# Patient Record
Sex: FEMALE | Race: WHITE | Employment: UNEMPLOYED | URBAN - METROPOLITAN AREA
[De-identification: names, ages, dates, MRNs, and addresses within clinical notes are randomized per-mention and may not be internally consistent; named-entity substitution may affect disease eponyms.]

---

## 2020-07-27 ENCOUNTER — APPOINTMENT (OUTPATIENT)
Dept: CT IMAGING | Age: 70
DRG: 853 | End: 2020-07-27
Payer: COMMERCIAL

## 2020-07-27 ENCOUNTER — HOSPITAL ENCOUNTER (INPATIENT)
Age: 70
LOS: 2 days | Discharge: HOME | DRG: 853 | End: 2020-07-30
Attending: EMERGENCY MEDICINE | Admitting: STUDENT IN AN ORGANIZED HEALTH CARE EDUCATION/TRAINING PROGRAM
Payer: COMMERCIAL

## 2020-07-27 DIAGNOSIS — L02.214 GROIN ABSCESS: ICD-10-CM

## 2020-07-27 DIAGNOSIS — L03.119 CELLULITIS AND ABSCESS OF LEG: Primary | ICD-10-CM

## 2020-07-27 DIAGNOSIS — L02.419 CELLULITIS AND ABSCESS OF LEG: Primary | ICD-10-CM

## 2020-07-27 LAB
ANION GAP SERPL CALCULATED.3IONS-SCNC: 13 MMOL/L (ref 3–16)
BASE EXCESS VENOUS: 4.2 MMOL/L (ref -2–3)
BASOPHILS ABSOLUTE: 0.1 K/UL (ref 0–0.2)
BASOPHILS RELATIVE PERCENT: 0.5 %
BUN BLDV-MCNC: 7 MG/DL (ref 7–20)
CALCIUM SERPL-MCNC: 9.3 MG/DL (ref 8.3–10.6)
CARBOXYHEMOGLOBIN: 1.1 % (ref 0–1.5)
CHLORIDE BLD-SCNC: 97 MMOL/L (ref 99–110)
CO2: 25 MMOL/L (ref 21–32)
CREAT SERPL-MCNC: 0.7 MG/DL (ref 0.6–1.2)
EOSINOPHILS ABSOLUTE: 0 K/UL (ref 0–0.6)
EOSINOPHILS RELATIVE PERCENT: 0.3 %
GFR AFRICAN AMERICAN: >60
GFR NON-AFRICAN AMERICAN: >60
GLUCOSE BLD-MCNC: 109 MG/DL (ref 70–99)
HCO3 VENOUS: 28.7 MMOL/L (ref 24–28)
HCT VFR BLD CALC: 37.2 % (ref 36–48)
HEMOGLOBIN, VEN, REDUCED: 64.8 %
HEMOGLOBIN: 12.5 G/DL (ref 12–16)
LACTIC ACID: 1.3 MMOL/L (ref 0.4–2)
LYMPHOCYTES ABSOLUTE: 1.6 K/UL (ref 1–5.1)
LYMPHOCYTES RELATIVE PERCENT: 13.2 %
MAGNESIUM: 1.9 MG/DL (ref 1.8–2.4)
MCH RBC QN AUTO: 30.5 PG (ref 26–34)
MCHC RBC AUTO-ENTMCNC: 33.5 G/DL (ref 31–36)
MCV RBC AUTO: 91 FL (ref 80–100)
METHEMOGLOBIN VENOUS: 0.5 % (ref 0–1.5)
MONOCYTES ABSOLUTE: 1.2 K/UL (ref 0–1.3)
MONOCYTES RELATIVE PERCENT: 9.4 %
NEUTROPHILS ABSOLUTE: 9.4 K/UL (ref 1.7–7.7)
NEUTROPHILS RELATIVE PERCENT: 76.6 %
O2 SAT, VEN: 34 %
PCO2, VEN: 44.4 MMHG (ref 41–51)
PDW BLD-RTO: 13.3 % (ref 12.4–15.4)
PH VENOUS: 7.43 (ref 7.35–7.45)
PLATELET # BLD: 394 K/UL (ref 135–450)
PMV BLD AUTO: 8.1 FL (ref 5–10.5)
PO2, VEN: 25.1 MMHG (ref 25–40)
POTASSIUM REFLEX MAGNESIUM: 3.4 MMOL/L (ref 3.5–5.1)
RBC # BLD: 4.09 M/UL (ref 4–5.2)
SODIUM BLD-SCNC: 135 MMOL/L (ref 136–145)
TCO2 CALC VENOUS: 30 MMOL/L
WBC # BLD: 12.2 K/UL (ref 4–11)

## 2020-07-27 PROCEDURE — 80048 BASIC METABOLIC PNL TOTAL CA: CPT

## 2020-07-27 PROCEDURE — 2580000003 HC RX 258: Performed by: PHYSICIAN ASSISTANT

## 2020-07-27 PROCEDURE — 6360000002 HC RX W HCPCS: Performed by: PHYSICIAN ASSISTANT

## 2020-07-27 PROCEDURE — 96368 THER/DIAG CONCURRENT INF: CPT

## 2020-07-27 PROCEDURE — 82803 BLOOD GASES ANY COMBINATION: CPT

## 2020-07-27 PROCEDURE — 96365 THER/PROPH/DIAG IV INF INIT: CPT

## 2020-07-27 PROCEDURE — 83735 ASSAY OF MAGNESIUM: CPT

## 2020-07-27 PROCEDURE — 87040 BLOOD CULTURE FOR BACTERIA: CPT

## 2020-07-27 PROCEDURE — 96366 THER/PROPH/DIAG IV INF ADDON: CPT

## 2020-07-27 PROCEDURE — 85025 COMPLETE CBC W/AUTO DIFF WBC: CPT

## 2020-07-27 PROCEDURE — 99284 EMERGENCY DEPT VISIT MOD MDM: CPT

## 2020-07-27 PROCEDURE — 81001 URINALYSIS AUTO W/SCOPE: CPT

## 2020-07-27 PROCEDURE — 6370000000 HC RX 637 (ALT 250 FOR IP): Performed by: PHYSICIAN ASSISTANT

## 2020-07-27 PROCEDURE — 83605 ASSAY OF LACTIC ACID: CPT

## 2020-07-27 RX ORDER — FLUCONAZOLE 200 MG/1
200 TABLET ORAL ONCE
Status: COMPLETED | OUTPATIENT
Start: 2020-07-27 | End: 2020-07-27

## 2020-07-27 RX ORDER — SODIUM CHLORIDE, SODIUM LACTATE, POTASSIUM CHLORIDE, AND CALCIUM CHLORIDE .6; .31; .03; .02 G/100ML; G/100ML; G/100ML; G/100ML
30 INJECTION, SOLUTION INTRAVENOUS ONCE
Status: COMPLETED | OUTPATIENT
Start: 2020-07-27 | End: 2020-07-28

## 2020-07-27 RX ADMIN — SODIUM CHLORIDE, POTASSIUM CHLORIDE, SODIUM LACTATE AND CALCIUM CHLORIDE 2382 ML: 600; 310; 30; 20 INJECTION, SOLUTION INTRAVENOUS at 21:54

## 2020-07-27 RX ADMIN — FLUCONAZOLE 200 MG: 200 TABLET ORAL at 21:54

## 2020-07-27 RX ADMIN — CEFAZOLIN SODIUM 1 G: 1 POWDER, FOR SOLUTION INTRAMUSCULAR; INTRAVENOUS at 21:54

## 2020-07-27 SDOH — HEALTH STABILITY: MENTAL HEALTH: HOW OFTEN DO YOU HAVE A DRINK CONTAINING ALCOHOL?: NEVER

## 2020-07-27 ASSESSMENT — ENCOUNTER SYMPTOMS
VOMITING: 0
COUGH: 0
NAUSEA: 0
SHORTNESS OF BREATH: 0
ABDOMINAL PAIN: 0
DIARRHEA: 0

## 2020-07-27 ASSESSMENT — PAIN DESCRIPTION - LOCATION: LOCATION: GROIN

## 2020-07-27 ASSESSMENT — PAIN SCALES - GENERAL: PAINLEVEL_OUTOF10: 5

## 2020-07-27 ASSESSMENT — PAIN DESCRIPTION - PAIN TYPE: TYPE: ACUTE PAIN

## 2020-07-27 ASSESSMENT — PAIN DESCRIPTION - ORIENTATION: ORIENTATION: RIGHT

## 2020-07-27 ASSESSMENT — PAIN DESCRIPTION - DESCRIPTORS: DESCRIPTORS: BURNING

## 2020-07-28 ENCOUNTER — ANESTHESIA (OUTPATIENT)
Dept: OPERATING ROOM | Age: 70
DRG: 853 | End: 2020-07-28
Payer: COMMERCIAL

## 2020-07-28 ENCOUNTER — ANESTHESIA EVENT (OUTPATIENT)
Dept: OPERATING ROOM | Age: 70
DRG: 853 | End: 2020-07-28
Payer: COMMERCIAL

## 2020-07-28 ENCOUNTER — APPOINTMENT (OUTPATIENT)
Dept: CT IMAGING | Age: 70
DRG: 853 | End: 2020-07-28
Payer: COMMERCIAL

## 2020-07-28 VITALS — OXYGEN SATURATION: 99 % | TEMPERATURE: 97.5 F | SYSTOLIC BLOOD PRESSURE: 91 MMHG | DIASTOLIC BLOOD PRESSURE: 52 MMHG

## 2020-07-28 PROBLEM — L02.214 GROIN ABSCESS: Status: ACTIVE | Noted: 2020-07-28

## 2020-07-28 LAB
ABO/RH: NORMAL
ANTIBODY SCREEN: NORMAL
BACTERIA: ABNORMAL /HPF
BILIRUBIN URINE: NEGATIVE
BLOOD, URINE: ABNORMAL
CLARITY: CLEAR
COLOR: YELLOW
EKG ATRIAL RATE: 87 BPM
EKG DIAGNOSIS: NORMAL
EKG P AXIS: 56 DEGREES
EKG P-R INTERVAL: 170 MS
EKG Q-T INTERVAL: 370 MS
EKG QRS DURATION: 90 MS
EKG QTC CALCULATION (BAZETT): 445 MS
EKG R AXIS: 57 DEGREES
EKG T AXIS: 44 DEGREES
EKG VENTRICULAR RATE: 87 BPM
EPITHELIAL CELLS, UA: ABNORMAL /HPF (ref 0–5)
GLUCOSE URINE: NEGATIVE MG/DL
INR BLD: 1.2 (ref 0.86–1.14)
KETONES, URINE: 15 MG/DL
LEUKOCYTE ESTERASE, URINE: ABNORMAL
MICROSCOPIC EXAMINATION: YES
NITRITE, URINE: NEGATIVE
PH UA: 7 (ref 5–8)
PROTEIN UA: ABNORMAL MG/DL
PROTHROMBIN TIME: 13.9 SEC (ref 10–13.2)
RBC UA: ABNORMAL /HPF (ref 0–4)
SPECIFIC GRAVITY UA: 1.01 (ref 1–1.03)
URINE TYPE: ABNORMAL
UROBILINOGEN, URINE: 0.2 E.U./DL
WBC UA: ABNORMAL /HPF (ref 0–5)

## 2020-07-28 PROCEDURE — 3600000012 HC SURGERY LEVEL 2 ADDTL 15MIN: Performed by: SURGERY

## 2020-07-28 PROCEDURE — 6360000004 HC RX CONTRAST MEDICATION: Performed by: EMERGENCY MEDICINE

## 2020-07-28 PROCEDURE — 72193 CT PELVIS W/DYE: CPT

## 2020-07-28 PROCEDURE — 87102 FUNGUS ISOLATION CULTURE: CPT

## 2020-07-28 PROCEDURE — 6360000002 HC RX W HCPCS: Performed by: STUDENT IN AN ORGANIZED HEALTH CARE EDUCATION/TRAINING PROGRAM

## 2020-07-28 PROCEDURE — 7100000000 HC PACU RECOVERY - FIRST 15 MIN: Performed by: SURGERY

## 2020-07-28 PROCEDURE — 2580000003 HC RX 258: Performed by: STUDENT IN AN ORGANIZED HEALTH CARE EDUCATION/TRAINING PROGRAM

## 2020-07-28 PROCEDURE — 87077 CULTURE AEROBIC IDENTIFY: CPT

## 2020-07-28 PROCEDURE — 85610 PROTHROMBIN TIME: CPT

## 2020-07-28 PROCEDURE — 87075 CULTR BACTERIA EXCEPT BLOOD: CPT

## 2020-07-28 PROCEDURE — U0003 INFECTIOUS AGENT DETECTION BY NUCLEIC ACID (DNA OR RNA); SEVERE ACUTE RESPIRATORY SYNDROME CORONAVIRUS 2 (SARS-COV-2) (CORONAVIRUS DISEASE [COVID-19]), AMPLIFIED PROBE TECHNIQUE, MAKING USE OF HIGH THROUGHPUT TECHNOLOGIES AS DESCRIBED BY CMS-2020-01-R: HCPCS

## 2020-07-28 PROCEDURE — 6370000000 HC RX 637 (ALT 250 FOR IP): Performed by: ANESTHESIOLOGY

## 2020-07-28 PROCEDURE — 1200000000 HC SEMI PRIVATE

## 2020-07-28 PROCEDURE — 6360000002 HC RX W HCPCS: Performed by: NURSE ANESTHETIST, CERTIFIED REGISTERED

## 2020-07-28 PROCEDURE — 93005 ELECTROCARDIOGRAM TRACING: CPT | Performed by: STUDENT IN AN ORGANIZED HEALTH CARE EDUCATION/TRAINING PROGRAM

## 2020-07-28 PROCEDURE — 7100000001 HC PACU RECOVERY - ADDTL 15 MIN: Performed by: SURGERY

## 2020-07-28 PROCEDURE — 0J9L0ZZ DRAINAGE OF RIGHT UPPER LEG SUBCUTANEOUS TISSUE AND FASCIA, OPEN APPROACH: ICD-10-PCS | Performed by: SURGERY

## 2020-07-28 PROCEDURE — 2580000003 HC RX 258: Performed by: PHYSICIAN ASSISTANT

## 2020-07-28 PROCEDURE — 3700000000 HC ANESTHESIA ATTENDED CARE: Performed by: SURGERY

## 2020-07-28 PROCEDURE — 3600000002 HC SURGERY LEVEL 2 BASE: Performed by: SURGERY

## 2020-07-28 PROCEDURE — 2500000003 HC RX 250 WO HCPCS: Performed by: PHYSICIAN ASSISTANT

## 2020-07-28 PROCEDURE — 2500000003 HC RX 250 WO HCPCS: Performed by: NURSE ANESTHETIST, CERTIFIED REGISTERED

## 2020-07-28 PROCEDURE — 86850 RBC ANTIBODY SCREEN: CPT

## 2020-07-28 PROCEDURE — 2500000003 HC RX 250 WO HCPCS: Performed by: STUDENT IN AN ORGANIZED HEALTH CARE EDUCATION/TRAINING PROGRAM

## 2020-07-28 PROCEDURE — 27301 DRAIN THIGH/KNEE LESION: CPT | Performed by: SURGERY

## 2020-07-28 PROCEDURE — 86900 BLOOD TYPING SEROLOGIC ABO: CPT

## 2020-07-28 PROCEDURE — 87070 CULTURE OTHR SPECIMN AEROBIC: CPT

## 2020-07-28 PROCEDURE — 87206 SMEAR FLUORESCENT/ACID STAI: CPT

## 2020-07-28 PROCEDURE — 87040 BLOOD CULTURE FOR BACTERIA: CPT

## 2020-07-28 PROCEDURE — 87116 MYCOBACTERIA CULTURE: CPT

## 2020-07-28 PROCEDURE — 87205 SMEAR GRAM STAIN: CPT

## 2020-07-28 PROCEDURE — 2709999900 HC NON-CHARGEABLE SUPPLY: Performed by: SURGERY

## 2020-07-28 PROCEDURE — 87015 SPECIMEN INFECT AGNT CONCNTJ: CPT

## 2020-07-28 PROCEDURE — 3700000001 HC ADD 15 MINUTES (ANESTHESIA): Performed by: SURGERY

## 2020-07-28 PROCEDURE — 99223 1ST HOSP IP/OBS HIGH 75: CPT | Performed by: SURGERY

## 2020-07-28 PROCEDURE — 86901 BLOOD TYPING SEROLOGIC RH(D): CPT

## 2020-07-28 PROCEDURE — 2500000003 HC RX 250 WO HCPCS: Performed by: SURGERY

## 2020-07-28 PROCEDURE — 94150 VITAL CAPACITY TEST: CPT

## 2020-07-28 RX ORDER — PROPOFOL 10 MG/ML
INJECTION, EMULSION INTRAVENOUS PRN
Status: DISCONTINUED | OUTPATIENT
Start: 2020-07-28 | End: 2020-07-28 | Stop reason: SDUPTHER

## 2020-07-28 RX ORDER — FENTANYL CITRATE 50 UG/ML
25 INJECTION, SOLUTION INTRAMUSCULAR; INTRAVENOUS EVERY 5 MIN PRN
Status: DISCONTINUED | OUTPATIENT
Start: 2020-07-28 | End: 2020-07-28 | Stop reason: HOSPADM

## 2020-07-28 RX ORDER — SODIUM CHLORIDE 0.9 % (FLUSH) 0.9 %
10 SYRINGE (ML) INJECTION EVERY 12 HOURS SCHEDULED
Status: DISCONTINUED | OUTPATIENT
Start: 2020-07-28 | End: 2020-07-30 | Stop reason: HOSPADM

## 2020-07-28 RX ORDER — ONDANSETRON 2 MG/ML
4 INJECTION INTRAMUSCULAR; INTRAVENOUS
Status: DISCONTINUED | OUTPATIENT
Start: 2020-07-28 | End: 2020-07-28 | Stop reason: HOSPADM

## 2020-07-28 RX ORDER — ONDANSETRON 2 MG/ML
4 INJECTION INTRAMUSCULAR; INTRAVENOUS EVERY 6 HOURS PRN
Status: DISCONTINUED | OUTPATIENT
Start: 2020-07-28 | End: 2020-07-30 | Stop reason: HOSPADM

## 2020-07-28 RX ORDER — ACETAMINOPHEN 325 MG/1
650 TABLET ORAL EVERY 4 HOURS PRN
Status: DISCONTINUED | OUTPATIENT
Start: 2020-07-28 | End: 2020-07-30 | Stop reason: HOSPADM

## 2020-07-28 RX ORDER — ENALAPRILAT 2.5 MG/2ML
1.25 INJECTION INTRAVENOUS
Status: DISCONTINUED | OUTPATIENT
Start: 2020-07-28 | End: 2020-07-28 | Stop reason: HOSPADM

## 2020-07-28 RX ORDER — SODIUM CHLORIDE, SODIUM LACTATE, POTASSIUM CHLORIDE, CALCIUM CHLORIDE 600; 310; 30; 20 MG/100ML; MG/100ML; MG/100ML; MG/100ML
INJECTION, SOLUTION INTRAVENOUS CONTINUOUS
Status: DISCONTINUED | OUTPATIENT
Start: 2020-07-28 | End: 2020-07-29

## 2020-07-28 RX ORDER — GLYCOPYRROLATE 1 MG/5 ML
SYRINGE (ML) INTRAVENOUS PRN
Status: DISCONTINUED | OUTPATIENT
Start: 2020-07-28 | End: 2020-07-28 | Stop reason: SDUPTHER

## 2020-07-28 RX ORDER — HYDRALAZINE HYDROCHLORIDE 20 MG/ML
5 INJECTION INTRAMUSCULAR; INTRAVENOUS EVERY 5 MIN PRN
Status: DISCONTINUED | OUTPATIENT
Start: 2020-07-28 | End: 2020-07-28 | Stop reason: HOSPADM

## 2020-07-28 RX ORDER — OXYCODONE HYDROCHLORIDE 5 MG/1
5 TABLET ORAL EVERY 4 HOURS PRN
Status: DISCONTINUED | OUTPATIENT
Start: 2020-07-28 | End: 2020-07-30 | Stop reason: HOSPADM

## 2020-07-28 RX ORDER — KETAMINE HYDROCHLORIDE 50 MG/ML
INJECTION, SOLUTION, CONCENTRATE INTRAMUSCULAR; INTRAVENOUS PRN
Status: DISCONTINUED | OUTPATIENT
Start: 2020-07-28 | End: 2020-07-28 | Stop reason: SDUPTHER

## 2020-07-28 RX ORDER — BUPIVACAINE HYDROCHLORIDE AND EPINEPHRINE 5; 5 MG/ML; UG/ML
INJECTION, SOLUTION EPIDURAL; INTRACAUDAL; PERINEURAL PRN
Status: DISCONTINUED | OUTPATIENT
Start: 2020-07-28 | End: 2020-07-28 | Stop reason: ALTCHOICE

## 2020-07-28 RX ORDER — LIDOCAINE HYDROCHLORIDE 20 MG/ML
INJECTION, SOLUTION INTRAVENOUS PRN
Status: DISCONTINUED | OUTPATIENT
Start: 2020-07-28 | End: 2020-07-28 | Stop reason: SDUPTHER

## 2020-07-28 RX ORDER — OXYCODONE HYDROCHLORIDE 5 MG/1
10 TABLET ORAL EVERY 4 HOURS PRN
Status: DISCONTINUED | OUTPATIENT
Start: 2020-07-28 | End: 2020-07-30 | Stop reason: HOSPADM

## 2020-07-28 RX ORDER — ONDANSETRON 2 MG/ML
INJECTION INTRAMUSCULAR; INTRAVENOUS PRN
Status: DISCONTINUED | OUTPATIENT
Start: 2020-07-28 | End: 2020-07-28 | Stop reason: SDUPTHER

## 2020-07-28 RX ORDER — HYDROMORPHONE HCL 110MG/55ML
PATIENT CONTROLLED ANALGESIA SYRINGE INTRAVENOUS PRN
Status: DISCONTINUED | OUTPATIENT
Start: 2020-07-28 | End: 2020-07-28 | Stop reason: SDUPTHER

## 2020-07-28 RX ORDER — LABETALOL 20 MG/4 ML (5 MG/ML) INTRAVENOUS SYRINGE
5 EVERY 10 MIN PRN
Status: DISCONTINUED | OUTPATIENT
Start: 2020-07-28 | End: 2020-07-28 | Stop reason: HOSPADM

## 2020-07-28 RX ORDER — SODIUM CHLORIDE 0.9 % (FLUSH) 0.9 %
10 SYRINGE (ML) INJECTION PRN
Status: DISCONTINUED | OUTPATIENT
Start: 2020-07-28 | End: 2020-07-30 | Stop reason: HOSPADM

## 2020-07-28 RX ORDER — CLINDAMYCIN PHOSPHATE 900 MG/50ML
900 INJECTION INTRAVENOUS EVERY 8 HOURS
Status: DISCONTINUED | OUTPATIENT
Start: 2020-07-28 | End: 2020-07-29

## 2020-07-28 RX ORDER — DOCUSATE SODIUM 100 MG/1
100 CAPSULE, LIQUID FILLED ORAL 2 TIMES DAILY PRN
Status: DISCONTINUED | OUTPATIENT
Start: 2020-07-28 | End: 2020-07-30 | Stop reason: HOSPADM

## 2020-07-28 RX ORDER — FENTANYL CITRATE 50 UG/ML
50 INJECTION, SOLUTION INTRAMUSCULAR; INTRAVENOUS EVERY 5 MIN PRN
Status: DISCONTINUED | OUTPATIENT
Start: 2020-07-28 | End: 2020-07-28 | Stop reason: HOSPADM

## 2020-07-28 RX ADMIN — PROPOFOL 150 MG: 10 INJECTION, EMULSION INTRAVENOUS at 14:09

## 2020-07-28 RX ADMIN — Medication 10 ML: at 09:10

## 2020-07-28 RX ADMIN — IOPAMIDOL 80 ML: 755 INJECTION, SOLUTION INTRAVENOUS at 00:17

## 2020-07-28 RX ADMIN — CLINDAMYCIN PHOSPHATE 900 MG: 900 INJECTION, SOLUTION INTRAVENOUS at 12:27

## 2020-07-28 RX ADMIN — DOXYCYCLINE 100 MG: 100 INJECTION, POWDER, LYOPHILIZED, FOR SOLUTION INTRAVENOUS at 00:22

## 2020-07-28 RX ADMIN — Medication 0.3 MG: at 13:58

## 2020-07-28 RX ADMIN — BENZOCAINE AND MENTHOL 1 LOZENGE: 15; 3.6 LOZENGE ORAL at 20:38

## 2020-07-28 RX ADMIN — VANCOMYCIN HYDROCHLORIDE 1250 MG: 10 INJECTION, POWDER, LYOPHILIZED, FOR SOLUTION INTRAVENOUS at 16:54

## 2020-07-28 RX ADMIN — HYDROMORPHONE HYDROCHLORIDE 1.2 MG: 2 INJECTION, SOLUTION INTRAMUSCULAR; INTRAVENOUS; SUBCUTANEOUS at 13:49

## 2020-07-28 RX ADMIN — PIPERACILLIN AND TAZOBACTAM 3.38 G: 3; .375 INJECTION, POWDER, LYOPHILIZED, FOR SOLUTION INTRAVENOUS at 03:59

## 2020-07-28 RX ADMIN — PIPERACILLIN AND TAZOBACTAM 3.38 G: 3; .375 INJECTION, POWDER, LYOPHILIZED, FOR SOLUTION INTRAVENOUS at 21:56

## 2020-07-28 RX ADMIN — CLINDAMYCIN PHOSPHATE 900 MG: 900 INJECTION, SOLUTION INTRAVENOUS at 04:59

## 2020-07-28 RX ADMIN — ONDANSETRON 4 MG: 2 INJECTION INTRAMUSCULAR; INTRAVENOUS at 13:38

## 2020-07-28 RX ADMIN — FAMOTIDINE 20 MG: 10 INJECTION, SOLUTION INTRAVENOUS at 13:38

## 2020-07-28 RX ADMIN — PROPOFOL 150 MG: 10 INJECTION, EMULSION INTRAVENOUS at 13:42

## 2020-07-28 RX ADMIN — LIDOCAINE HYDROCHLORIDE 100 MG: 20 INJECTION, SOLUTION INTRAVENOUS at 13:42

## 2020-07-28 RX ADMIN — VANCOMYCIN HYDROCHLORIDE 1250 MG: 10 INJECTION, POWDER, LYOPHILIZED, FOR SOLUTION INTRAVENOUS at 05:51

## 2020-07-28 RX ADMIN — HYDROMORPHONE HYDROCHLORIDE 0.8 MG: 2 INJECTION, SOLUTION INTRAMUSCULAR; INTRAVENOUS; SUBCUTANEOUS at 13:42

## 2020-07-28 RX ADMIN — ENOXAPARIN SODIUM 40 MG: 40 INJECTION SUBCUTANEOUS at 09:13

## 2020-07-28 RX ADMIN — CLINDAMYCIN PHOSPHATE 900 MG: 900 INJECTION, SOLUTION INTRAVENOUS at 20:25

## 2020-07-28 RX ADMIN — PIPERACILLIN AND TAZOBACTAM 3.38 G: 3; .375 INJECTION, POWDER, LYOPHILIZED, FOR SOLUTION INTRAVENOUS at 12:27

## 2020-07-28 RX ADMIN — SODIUM CHLORIDE, POTASSIUM CHLORIDE, SODIUM LACTATE AND CALCIUM CHLORIDE: 600; 310; 30; 20 INJECTION, SOLUTION INTRAVENOUS at 04:00

## 2020-07-28 RX ADMIN — BENZOCAINE AND MENTHOL 1 LOZENGE: 15; 3.6 LOZENGE ORAL at 15:20

## 2020-07-28 RX ADMIN — KETAMINE HYDROCHLORIDE 25 MG: 50 INJECTION, SOLUTION INTRAMUSCULAR; INTRAVENOUS at 13:43

## 2020-07-28 ASSESSMENT — PULMONARY FUNCTION TESTS
PIF_VALUE: 30
PIF_VALUE: 8
PIF_VALUE: 17
PIF_VALUE: 0
PIF_VALUE: 3
PIF_VALUE: 0
PIF_VALUE: 13
PIF_VALUE: 13
PIF_VALUE: 23
PIF_VALUE: 11
PIF_VALUE: 0
PIF_VALUE: 14
PIF_VALUE: 12
PIF_VALUE: 14
PIF_VALUE: 13
PIF_VALUE: 0
PIF_VALUE: 14
PIF_VALUE: 8
PIF_VALUE: 17
PIF_VALUE: 13
PIF_VALUE: 9
PIF_VALUE: 13
PIF_VALUE: 14
PIF_VALUE: 14
PIF_VALUE: 12
PIF_VALUE: 17
PIF_VALUE: 8
PIF_VALUE: 12
PIF_VALUE: 17
PIF_VALUE: 14
PIF_VALUE: 8
PIF_VALUE: 12
PIF_VALUE: 0
PIF_VALUE: 13
PIF_VALUE: 14
PIF_VALUE: 14
PIF_VALUE: 0
PIF_VALUE: 2
PIF_VALUE: 14
PIF_VALUE: 13
PIF_VALUE: 14
PIF_VALUE: 12
PIF_VALUE: 12
PIF_VALUE: 14
PIF_VALUE: 13
PIF_VALUE: 13
PIF_VALUE: 12
PIF_VALUE: 3
PIF_VALUE: 17
PIF_VALUE: 2
PIF_VALUE: 13
PIF_VALUE: 14

## 2020-07-28 ASSESSMENT — PAIN SCALES - GENERAL
PAINLEVEL_OUTOF10: 0
PAINLEVEL_OUTOF10: 3
PAINLEVEL_OUTOF10: 0

## 2020-07-28 NOTE — ED PROVIDER NOTES
[address] [facility] [address] ENCOUNTER          PHYSICIAN ASSISTANT NOTE       Date of evaluation: 7/27/2020    Chief Complaint     Abscess      History of Present Illness     Rosie Perez is a 79 y.o. female who presents with complaints of abscess. Patient reports she was gardening last week and after felt a pain in her right groin. Patient states she though a bug had bit her or that she had pulled a muscle. Patient states she then developed temperatures of 103. She has been taking tylenol with improvement of her fever. She states she was seen by her PCP who obtained a chest xray and COVID test. Both of these were negative. Patient was seen again today for follow up and instructed to present to the emergency department for evaluation. Patient endorses pain to her right groin but is unable to see the area so cannot attest to any skin changes. She denies any cough, chest pain, shortness of breath, nausea, vomiting, abdominal pain, diarrhea, dysuria, or hematuria. Review of Systems     Review of Systems   Constitutional: Positive for chills and fever. Respiratory: Negative for cough and shortness of breath. Cardiovascular: Negative for chest pain. Gastrointestinal: Negative for abdominal pain, diarrhea, nausea and vomiting. Genitourinary: Negative for dysuria and hematuria. Past Medical, Surgical, Family, and Social History     She has no past medical history on file. She has a past surgical history that includes Appendectomy. Her family history is not on file. She reports that she has never smoked. She has never used smokeless tobacco. She reports that she does not drink alcohol or use drugs. Medications     Previous Medications    No medications on file       Allergies     She has No Known Allergies.     Physical Exam     INITIAL VITALS: BP: 136/83, Temp: 101 °F (38.3 °C), Pulse: 103, Resp: 18, SpO2: 99 %    General: 79 y.o. female in no apparent distress, well developed, well nourished, non-toxic appearance. HEENT: Atraumatic, normocephalic. EOMs intact. Neck:  Full range of motion. Chest/pulm: Respiratory rate normal. Speaks in complete sentences, no respiratory distress, lungs CTA bilaterally, no wheezes, rales, rhonchi. Cardiovascular: Heart rate normal. RRR, no murmurs, rubs, gallops appreciated. Abdomen: No gross distension. Soft, non-tender, non-peritoneal. No suprapubic or CVAT. : Deferred. Musculoskeletal: No evident long bone or joint deformity. Neuro: A&O x 4. Normal speech without dysarthria or aphasia. Moves all extremities spontaneously and symmetrically. Movements normal without ataxia. Skin: Warm, dry. Large amount of erythema to right groin from medial midthigh to pubic area. Induration to right groin. No appreciable areas of fluctuance. White material to groin fold. Psych: Appropriate mood and affect, normal interaction. Diagnostic Results     RADIOLOGY:  CT PELVIS W CONTRAST Additional Contrast? None   Final Result     Soft tissue induration at the right groin with amorphous fluid    collection measuring approximately 6.2 x 3.5 x 2.9 cm. Findings are    compatible with an infectious process with associated early abscess    formation in the appropriate clinical setting.               LABS:   Results for orders placed or performed during the hospital encounter of 07/27/20   CBC Auto Differential   Result Value Ref Range    WBC 12.2 (H) 4.0 - 11.0 K/uL    RBC 4.09 4.00 - 5.20 M/uL    Hemoglobin 12.5 12.0 - 16.0 g/dL    Hematocrit 37.2 36.0 - 48.0 %    MCV 91.0 80.0 - 100.0 fL    MCH 30.5 26.0 - 34.0 pg    MCHC 33.5 31.0 - 36.0 g/dL    RDW 13.3 12.4 - 15.4 %    Platelets 353 408 - 539 K/uL    MPV 8.1 5.0 - 10.5 fL    Neutrophils % 76.6 %    Lymphocytes % 13.2 %    Monocytes % 9.4 %    Eosinophils % 0.3 %    Basophils % 0.5 %    Neutrophils Absolute 9.4 (H) 1.7 - 7.7 K/uL    Lymphocytes Absolute 1.6 1.0 - 5.1 K/uL    Monocytes Absolute 1.2 0.0 - 1.3 K/uL    Eosinophils Absolute 0.0 0.0 - 0.6 K/uL    Basophils Absolute 0.1 0.0 - 0.2 K/uL   Basic Metabolic Panel w/ Reflex to MG   Result Value Ref Range    Sodium 135 (L) 136 - 145 mmol/L    Potassium reflex Magnesium 3.4 (L) 3.5 - 5.1 mmol/L    Chloride 97 (L) 99 - 110 mmol/L    CO2 25 21 - 32 mmol/L    Anion Gap 13 3 - 16    Glucose 109 (H) 70 - 99 mg/dL    BUN 7 7 - 20 mg/dL    CREATININE 0.7 0.6 - 1.2 mg/dL    GFR Non-African American >60 >60    GFR African American >60 >60    Calcium 9.3 8.3 - 10.6 mg/dL   Lactic Acid, Plasma   Result Value Ref Range    Lactic Acid 1.3 0.4 - 2.0 mmol/L   Blood Gas, Venous   Result Value Ref Range    pH, Diaz 7.426 7.350 - 7.450    pCO2, Diaz 44.4 41.0 - 51.0 mmHg    pO2, Diaz 25.1 25.0 - 40.0 mmHg    HCO3, Venous 28.7 (H) 24.0 - 28.0 mmol/L    Base Excess, Diaz 4.2 (H) -2.0 - 3.0 mmol/L    O2 Sat, Diaz 34 Not established %    Carboxyhemoglobin 1.1 0.0 - 1.5 %    MetHgb, Diaz 0.5 0.0 - 1.5 %    TC02 (Calc), Diaz 30 mmol/L    Hemoglobin, Diaz, Reduced 64.80 %   Urinalysis   Result Value Ref Range    Color, UA Yellow Straw/Yellow    Clarity, UA Clear Clear    Glucose, Ur Negative Negative mg/dL    Bilirubin Urine Negative Negative    Ketones, Urine 15 (A) Negative mg/dL    Specific Gravity, UA 1.010 1.005 - 1.030    Blood, Urine TRACE-INTACT (A) Negative    pH, UA 7.0 5.0 - 8.0    Protein, UA TRACE (A) Negative mg/dL    Urobilinogen, Urine 0.2 <2.0 E.U./dL    Nitrite, Urine Negative Negative    Leukocyte Esterase, Urine LARGE (A) Negative    Microscopic Examination YES     Urine Type Voided    Magnesium   Result Value Ref Range    Magnesium 1.90 1.80 - 2.40 mg/dL   Microscopic Urinalysis   Result Value Ref Range    WBC, UA 21-50 (A) 0 - 5 /HPF    RBC, UA 3-4 0 - 4 /HPF    Epithelial Cells, UA 0-1 0 - 5 /HPF    Bacteria, UA 2+ (A) None Seen /HPF         RECENT VITALS:  BP: 118/69, Temp: 99.3 °F (37.4 °C), Pulse: 82, Resp: 22, SpO2: 100 %       ED Course     Nursing Notes, Past Medical Hx,Past Surgical Hx, Social Hx, Allergies, and Family Hx were reviewed. The patient was given the following medications:  Orders Placed This Encounter   Medications    lactated ringers bolus    ceFAZolin (ANCEF) 1 g in dextrose 5 % 50 mL IVPB (mini-bag)    doxycycline (VIBRAMYCIN) 100 mg in dextrose 5 % 100 mL IVPB    fluconazole (DIFLUCAN) tablet 200 mg    iopamidol (ISOVUE-370) 76 % injection 80 mL       CONSULTS:  C/ Patric Owens 19 / ASSESSMENT / Caprice Jacquelyn is a 79 y.o. female presenting with complaints of abscess. On presentation, patient is well-appearing and in no acute distress. Patient is afebrile with stable VS.    Upon presentation patient was febrile to 101. She was mildly tachycardic in the low 100s. Given this and her large area of cellulitis with possible fluid collection we started her on broad-spectrum antibiotics of doxycycline and Ancef. It is possible patient has a Candida component given the white material in the groin folds. Patient received a dose of Diflucan. Labs showed WBC of 12.2. No anemia, electrolyte abnormality, or renal dysfunction noted. Lactic within normal limits. CT pelvis show \"Soft tissue induration at the right groin with amorphous fluid collection measuring approximately 6.2 x 3.5 x 2.9 cm. Findings are compatible with an infectious process with associated early abscess formation in the appropriate clinical setting. \" Patient will need admission for broad spectrum antibiotics plus or minus surgical I&D. Consult to general surgery is pending. This patient was also evaluated by the attending physician. All care plans were discussed and agreed upon. Clinical Impression     1. Cellulitis and abscess of leg        Disposition     PATIENT REFERRED TO:  No follow-up provider specified.     DISCHARGE MEDICATIONS:  New Prescriptions    No medications on file       DISPOSITION Decision To Admit 07/28/2020 12:48:00 AM       Karoline Jaquez PA-C  07/28/20 0131

## 2020-07-28 NOTE — PROGRESS NOTES
4 Eyes Skin Assessment     The patient is being assess for  Admission    I agree that 2 RN's have performed a thorough Head to Toe Skin Assessment on the patient. ALL assessment sites listed below have been assessed. Areas assessed by both nurses: Will/Taina  [x]   Head, Face, and Ears   [x]   Shoulders, Back, and Chest  [x]   Arms, Elbows, and Hands   [x]   Coccyx, Sacrum, and Ischum  [x]   Legs, Feet, and Heels        Does the Patient have Skin Breakdown?   Yes a wound was noted on the Admission Assessment and an WOUND LDA was Initiated documentation include the Erica-wound, Wound Assessment, Measurements, Dressing Treatment, Drainage, and Color\",         Trevin Prevention initiated:  No   Wound Care Orders initiated:  No      WOC nurse consulted for Pressure Injury (Stage 3,4, Unstageable, DTI, NWPT, and Complex wounds):  No      Nurse 1 eSignature: Electronically signed by Natividad Mccabe RN on 7/28/20 at 5:36 AM EDT     **SHARE this note so that the co-signing nurse is able to place an eSignature**    Nurse 2 eSignature: Electronically signed by Shey Barboza RN on 7/28/20 at 5:41 AM EDT

## 2020-07-28 NOTE — ANESTHESIA POSTPROCEDURE EVALUATION
Department of Anesthesiology  Postprocedure Note    Patient: Camille Pederson  MRN: [id number]  YOB: 1950  Date of evaluation: 7/28/2020  Time:  4:17 PM     Procedure Summary     Date:  07/28/20 Room / Location:  [address] [place] [address]    Anesthesia Start:  0123 Anesthesia Stop:  1185    Procedure:  Right deep thigh abscess incision and drainage (Right Thigh) Diagnosis:  (Right groin abscess)    Surgeon: Savage Blanca DO Responsible Provider:  Ruth Ann Fuentes DO    Anesthesia Type:  general ASA Status:  2          Anesthesia Type: No value filed. Aaron Phase I: Aaron Score: 10    Aaron Phase II:      Last vitals: Reviewed and per EMR flowsheets.        Anesthesia Post Evaluation    Patient location during evaluation: PACU  Patient participation: complete - patient participated  Level of consciousness: awake and alert  Airway patency: patent  Nausea & Vomiting: no nausea and no vomiting  Cardiovascular status: blood pressure returned to baseline  Respiratory status: acceptable  Hydration status: euvolemic

## 2020-07-28 NOTE — ED PROVIDER NOTES
ED Attending Attestation Note     Date of evaluation: 7/27/2020    This patient was seen by the advance practice provider. I have seen and examined the patient, agree with the workup, evaluation, management and diagnosis. The care plan has been discussed. My assessment reveals a large area of inguinal erythema, induration, concerning for cellulitis versus fungal infection. The patient thinks that she was bit by something a few days ago and since then she has had fever, redness, swelling. She has a focal area of induration in the inguinal fold on the right side that is quite tender to palpation. It does not extend into the perineum. It ends at about the mid thigh but does extend into her right lower abdomen. She denies any history of diabetes. .     Shruti Jones MD  07/27/20 1479

## 2020-07-28 NOTE — ANESTHESIA PRE PROCEDURE
Department of Anesthesiology  Preprocedure Note       Name:  Billie Galvez   Age:  79 y.o.  :  1950                                          MRN:  [id number]         Date:  2020      Surgeon: Jasvir Estrada): Dakota Shah DO    Procedure: Procedure(s):  right groin abscess incision and drainage    Medications prior to admission:   Prior to Admission medications    Not on File       Current medications:    Current Facility-Administered Medications   Medication Dose Route Frequency Provider Last Rate Last Dose    piperacillin-tazobactam (ZOSYN) 3.375 g in dextrose 5 % 100 mL IVPB extended infusion (mini-bag)  3.375 g Intravenous Q8H Lee Giron MD 25 mL/hr at 20 1227 3.375 g at 20 1227    vancomycin (VANCOCIN) 1,250 mg in dextrose 5 % 250 mL IVPB  15 mg/kg Intravenous Q12H Lee Giron MD   Stopped at 20 0901    clindamycin (CLEOCIN) 900 mg in dextrose 5 % 50 mL IVPB  900 mg Intravenous Q8H Lee Giron MD 50 mL/hr at 20 1227 900 mg at 20 1227    lactated ringers infusion   Intravenous Continuous Lee Giron  mL/hr at 20 0602      sodium chloride flush 0.9 % injection 10 mL  10 mL Intravenous 2 times per day Lee Giron MD   10 mL at 20 0910    sodium chloride flush 0.9 % injection 10 mL  10 mL Intravenous PRN Lee Giron MD        ondansetron [facility]) injection 4 mg  4 mg Intravenous Q6H PRN Lee Giron MD        enoxaparin (LOVENOX) injection 40 mg  40 mg Subcutaneous Daily Lee Giron MD   40 mg at 20 0913    acetaminophen (TYLENOL) tablet 650 mg  650 mg Oral Q4H PRN Lee Giron MD           Allergies:  No Known Allergies    Problem List:    Patient Active Problem List   Diagnosis Code    Groin abscess L02.214       Past Medical History:  No past medical history on file.     Past Surgical History:        Procedure Laterality Date    APPENDECTOMY         Social History:    Social History Tobacco Use    Smoking status: Never Smoker    Smokeless tobacco: Never Used   Substance Use Topics    Alcohol use: Never     Frequency: Never                                Counseling given: Not Answered      Vital Signs (Current):   Vitals:    07/28/20 0445 07/28/20 0758 07/28/20 1001 07/28/20 1229   BP: (!) 116/59 107/61  109/66   Pulse: 89 73  76   Resp: 20 18 16 16   Temp: 100.5 °F (38.1 °C) 99.8 °F (37.7 °C)  100.1 °F (37.8 °C)   TempSrc: Oral Oral  Oral   SpO2: 98% 96% 96% 96%   Weight:       Height:                                                  BP Readings from Last 3 Encounters:   07/28/20 109/66       NPO Status: Time of last liquid consumption: 2100                        Time of last solid consumption: 1600                        Date of last liquid consumption: 07/27/20                        Date of last solid food consumption: 07/26/20    BMI:   Wt Readings from Last 3 Encounters:   07/27/20 175 lb (79.4 kg)     Body mass index is 28.25 kg/m². CBC:   Lab Results   Component Value Date    WBC 12.2 07/27/2020    RBC 4.09 07/27/2020    HGB 12.5 07/27/2020    HCT 37.2 07/27/2020    MCV 91.0 07/27/2020    RDW 13.3 07/27/2020     07/27/2020       CMP:   Lab Results   Component Value Date     07/27/2020    K 3.4 07/27/2020    CL 97 07/27/2020    CO2 25 07/27/2020    BUN 7 07/27/2020    CREATININE 0.7 07/27/2020    GFRAA >60 07/27/2020    LABGLOM >60 07/27/2020    GLUCOSE 109 07/27/2020    CALCIUM 9.3 07/27/2020       POC Tests: No results for input(s): POCGLU, POCNA, POCK, POCCL, POCBUN, POCHEMO, POCHCT in the last 72 hours.     Coags:   Lab Results   Component Value Date    PROTIME 13.9 07/28/2020    INR 1.20 07/28/2020       HCG (If Applicable): No results found for: PREGTESTUR, PREGSERUM, HCG, HCGQUANT     ABGs: No results found for: PHART, PO2ART, XDC7KSI, IAM8QCB, BEART, K8SOHMZB     Type & Screen (If Applicable):  No results found for: LABABO, LABRH    Drug/Infectious Status (If Applicable):  No results found for: HIV, HEPCAB    COVID-19 Screening (If Applicable): No results found for: COVID19      Anesthesia Evaluation  Patient summary reviewed and Nursing notes reviewed  Airway: Mallampati: II  TM distance: >3 FB   Neck ROM: full  Mouth opening: > = 3 FB Dental: normal exam         Pulmonary:Negative Pulmonary ROS                              Cardiovascular:Negative CV ROS                      Neuro/Psych:   Negative Neuro/Psych ROS              GI/Hepatic/Renal: Neg GI/Hepatic/Renal ROS            Endo/Other:                      ROS comment: Groin abscess Abdominal:           Vascular: negative vascular ROS. Anesthesia Plan      general     ASA 2       Induction: intravenous. Anesthetic plan and risks discussed with patient.                       Milind Batista MD   7/28/2020

## 2020-07-28 NOTE — PLAN OF CARE
Problem: Falls - Risk of:  Goal: Will remain free from falls  Description: Will remain free from falls  Outcome: Met This Shift  Goal: Absence of physical injury  Description: Absence of physical injury  Outcome: Met This Shift     Problem: Safety:  Goal: Free from accidental physical injury  Description: Free from accidental physical injury  Outcome: Met This Shift     Problem: Daily Care:  Goal: Daily care needs are met  Description: Daily care needs are met  Outcome: Met This Shift     Problem: Discharge Planning:  Goal: Patients continuum of care needs are met  Description: Patients continuum of care needs are met  Outcome: Met This Shift     Problem: Pain:  Goal: Patient's pain/discomfort is manageable  Description: Patient's pain/discomfort is manageable  Outcome: Ongoing     Problem: Skin Integrity:  Goal: Skin integrity will stabilize  Description: Skin integrity will stabilize  Outcome: Ongoing

## 2020-07-28 NOTE — PROGRESS NOTES
Bariatric Surgery           Post-Op Note    CC: right groin abscess     Subjective:  Pt reports doing well. Patient's denies pain at this time. Ate yogurt, pudding and turkey and tolerating diet with no nausea/vomiting. Has urinated, passing flatus and ambulating without difficulty. Denies SOB, chest pain, or palpitations. Objective:  Vitals:    07/28/20 1515 07/28/20 1530 07/28/20 1545 07/28/20 1638   BP: 126/65 125/68 126/65 118/68   Pulse: 81 75 75 73   Resp: 16 16 19 16   Temp:   97.7 °F (36.5 °C) 97.4 °F (36.3 °C)   TempSrc:   Temporal Oral   SpO2: 98% 97% 96% 97%   Weight:       Height:           Intake/Output Summary (Last 24 hours) at 7/28/2020 1906  Last data filed at 7/28/2020 1900  Gross per 24 hour   Intake 2397.31 ml   Output 1675 ml   Net 722.31 ml       IntraOp Crystalloid 1000    EBL 20    U/O 0        PostOp U/O  Voiding      Continuous Infusions:      lactated ringers 125 mL/hr at 07/28/20 0602         Physical Exam:      Vitals:    07/28/20 1638   BP: 118/68   Pulse: 73   Resp: 16   Temp: 97.4 °F (36.3 °C)   SpO2: 97%       CONSTITUTIONAL:  awake, alert, cooperative, no apparent distress  LUNGS:  CTAB, no R/R/W  CV: RRR, no m/r/g  ABDOMEN:  Soft, non tender, non-distended. EXT: Right surgical dressing c/d/i, motor and sensation intact, improved erythema, and less indurated. Assessment and Plan:  Paramjit Champagne is a 79 y.o. female patient s/p right deep thigh abscess incision and drainage  POD #0    1. Diet: General diet   2. Fluids:   3. Abx: continue clindamycin, zosyn, and vancomycin   4. Pain management: Tylenol, Oxycodone   5. Patient voiding appropriately. 6. Encourage ambulation and incentive spirometry use, patient was made aware of proper use. 7. Prophylaxis: SCDs, Lovenox 40 mg qd   8.  Stable post-op check    Durell Severin, DO  7/28/2020, 7:06 PM  Pager: [phone] stated

## 2020-07-28 NOTE — PLAN OF CARE
Problem: Falls - Risk of:  Goal: Will remain free from falls  Description: Will remain free from falls  7/28/2020 1824 by Laurent Ruano RN  Outcome: Ongoing     Problem: Falls - Risk of:  Goal: Absence of physical injury  Description: Absence of physical injury  7/28/2020 1824 by Laurent Ruano RN  Outcome: Ongoing     Problem: Safety:  Goal: Free from accidental physical injury  Description: Free from accidental physical injury  7/28/2020 1824 by Laurent Ruano RN  Outcome: Ongoing     Problem: Pain:  Goal: Patient's pain/discomfort is manageable  Description: Patient's pain/discomfort is manageable  7/28/2020 1824 by Laurent Ruano RN  Outcome: Ongoing     Problem: Skin Integrity:  Goal: Skin integrity will stabilize  Description: Skin integrity will stabilize  7/28/2020 1824 by Laurent Ruano RN  Outcome: Ongoing

## 2020-07-28 NOTE — PROGRESS NOTES
PACU Transfer Note    Vitals:    07/28/20 1545   BP: 126/65   Pulse: 75   Resp: 19   Temp: 97.7 °F (36.5 °C)   SpO2: 96%       No intake/output data recorded.     Pain assessment:  Pain Level: 0    Report given to Receiving unit RN.    7/28/2020 3:58 PM

## 2020-07-28 NOTE — H&P
General Surgery   Resident History and Physical       Chief Complaint: groin abscess     History of Present Illness:    Cassi Valentin is a 79 y.o. female who presents with 1 week of right groin pain and swelling that started after she was walking in some bushes. She thinks she was bitten by a spider. Pain has been progressively getting worse and is associated with high fevers (up to 103 F). She initially thought that fevers were due to COVID, but after the test came back negative she decided to seek medical attention. She denies any obstructive symptoms, including nausea, vomiting, constipation, diarrhea. She reports regular bowel movements. She is generally healthy and denies any medical conditions, including DM, HTN, heart disease and strokes. She denies taking any medications, including blood thinners. Past Medical History:    No past medical history on file. Past Surgical History:           Procedure Laterality Date    APPENDECTOMY         Allergies:  Patient has no known allergies. Medications:   Home Meds  No current facility-administered medications on file prior to encounter. No current outpatient medications on file prior to encounter. Current Meds  piperacillin-tazobactam (ZOSYN) 3.375 g in dextrose 5 % 100 mL IVPB extended infusion (mini-bag), Q8H  vancomycin (VANCOCIN) 1,250 mg in dextrose 5 % 250 mL IVPB, Q12H  clindamycin (CLEOCIN) 900 mg in dextrose 5 % 50 mL IVPB, Q8H  lactated ringers infusion, Continuous  sodium chloride flush 0.9 % injection 10 mL, 2 times per day  sodium chloride flush 0.9 % injection 10 mL, PRN  ondansetron (ZOFRAN) injection 4 mg, Q6H PRN  enoxaparin (LOVENOX) injection 40 mg, Daily        Family History:   No family history on file. Social History:   TOBACCO:   reports that she has never smoked. She has never used smokeless tobacco.  ETOH:   reports no history of alcohol use. DRUGS:   reports no history of drug use.     ROS: A 14 point review of systems was conducted, significant findings as noted in HPI. Physical exam:       Vitals:    07/28/20 0330   BP: 129/71   Pulse: 94   Resp: 26   Temp:    SpO2: 97%       General appearance: No acute distress   Neuro: Sensory-motor grossly intact   HEENT: mucous membranes are moist  Lungs: Non labored respirations   Heart: Normal rate   Abdomen: Soft, non-tender, non-distended   Skin: no obvious rashes   Extremities: no edema  Right groin: induration and erythema extending from just above the inguinal ligament to the medial med thigh and right labia, skin rash in the left groin    Labs:    CBC:   Recent Labs     07/27/20 2147   WBC 12.2*   HGB 12.5   HCT 37.2   MCV 91.0        BMP:   Recent Labs     07/27/20 2147   *   K 3.4*   CL 97*   CO2 25   BUN 7   CREATININE 0.7     PT/INR: No results for input(s): PROTIME, INR in the last 72 hours. APTT: No results for input(s): APTT in the last 72 hours. Liver Profile:  No results found for: AST, ALT, ALB, BILIDIR, BILITOT, ALKPHOS, GGT, 5NUCNo results found for: CHOL, HDL, TRIG  UA:   Lab Results   Component Value Date    COLORU Yellow 07/27/2020    PHUR 7.0 07/27/2020    WBCUA 21-50 07/27/2020    RBCUA 3-4 07/27/2020    BACTERIA 2+ 07/27/2020    CLARITYU Clear 07/27/2020    SPECGRAV 1.010 07/27/2020    LEUKOCYTESUR LARGE 07/27/2020    UROBILINOGEN 0.2 07/27/2020    BILIRUBINUR Negative 07/27/2020    BLOODU TRACE-INTACT 07/27/2020    GLUCOSEU Negative 07/27/2020       Imaging:   CT PELVIS W CONTRAST Additional Contrast? None   Final Result     Soft tissue induration at the right groin with amorphous fluid    collection measuring approximately 6.2 x 3.5 x 2.9 cm. Findings are    compatible with an infectious process with associated early abscess    formation in the appropriate clinical setting.                  Assessment/Plan:  79 y.o. female who presents with right groin abscess     - NPO, IVF, boad spectrum antibiotics (Vanc, Zosyn, Clinda)  - Will review the CT scan with Radiology this AM, but she is tentatively added to OR for this AM  - Consent on chart   - TST, EKG, INR    Fely Whitlock MD  7/28/2020 4:13 AM

## 2020-07-28 NOTE — PROGRESS NOTES
Incentive Spirometry education and demonstration completed by Respiratory Therapy. Turning over to Nursing for routine follow-up. Minimum Predicted Vital Capacity - 884 mL. Patient's Actual Vital Capacity - 1500 mL.

## 2020-07-29 LAB
ALBUMIN SERPL-MCNC: 2.9 G/DL (ref 3.4–5)
ANION GAP SERPL CALCULATED.3IONS-SCNC: 12 MMOL/L (ref 3–16)
BASOPHILS ABSOLUTE: 0.2 K/UL (ref 0–0.2)
BASOPHILS RELATIVE PERCENT: 1.5 %
BUN BLDV-MCNC: 4 MG/DL (ref 7–20)
CALCIUM SERPL-MCNC: 8.6 MG/DL (ref 8.3–10.6)
CHLORIDE BLD-SCNC: 99 MMOL/L (ref 99–110)
CO2: 25 MMOL/L (ref 21–32)
CREAT SERPL-MCNC: 0.8 MG/DL (ref 0.6–1.2)
EOSINOPHILS ABSOLUTE: 0.1 K/UL (ref 0–0.6)
EOSINOPHILS RELATIVE PERCENT: 1.1 %
GFR AFRICAN AMERICAN: >60
GFR NON-AFRICAN AMERICAN: >60
GLUCOSE BLD-MCNC: 149 MG/DL (ref 70–99)
HCT VFR BLD CALC: 31.9 % (ref 36–48)
HEMOGLOBIN: 10.9 G/DL (ref 12–16)
LYMPHOCYTES ABSOLUTE: 1.7 K/UL (ref 1–5.1)
LYMPHOCYTES RELATIVE PERCENT: 15.2 %
MAGNESIUM: 1.7 MG/DL (ref 1.8–2.4)
MCH RBC QN AUTO: 31 PG (ref 26–34)
MCHC RBC AUTO-ENTMCNC: 34.1 G/DL (ref 31–36)
MCV RBC AUTO: 91 FL (ref 80–100)
MONOCYTES ABSOLUTE: 0.9 K/UL (ref 0–1.3)
MONOCYTES RELATIVE PERCENT: 7.8 %
NEUTROPHILS ABSOLUTE: 8.2 K/UL (ref 1.7–7.7)
NEUTROPHILS RELATIVE PERCENT: 74.4 %
PDW BLD-RTO: 13 % (ref 12.4–15.4)
PHOSPHORUS: 3.9 MG/DL (ref 2.5–4.9)
PLATELET # BLD: 353 K/UL (ref 135–450)
PMV BLD AUTO: 8.2 FL (ref 5–10.5)
POTASSIUM SERPL-SCNC: 3.7 MMOL/L (ref 3.5–5.1)
RBC # BLD: 3.5 M/UL (ref 4–5.2)
SODIUM BLD-SCNC: 136 MMOL/L (ref 136–145)
WBC # BLD: 11.1 K/UL (ref 4–11)

## 2020-07-29 PROCEDURE — 6360000002 HC RX W HCPCS: Performed by: STUDENT IN AN ORGANIZED HEALTH CARE EDUCATION/TRAINING PROGRAM

## 2020-07-29 PROCEDURE — 1200000000 HC SEMI PRIVATE

## 2020-07-29 PROCEDURE — 2500000003 HC RX 250 WO HCPCS: Performed by: STUDENT IN AN ORGANIZED HEALTH CARE EDUCATION/TRAINING PROGRAM

## 2020-07-29 PROCEDURE — 85025 COMPLETE CBC W/AUTO DIFF WBC: CPT

## 2020-07-29 PROCEDURE — 6370000000 HC RX 637 (ALT 250 FOR IP): Performed by: STUDENT IN AN ORGANIZED HEALTH CARE EDUCATION/TRAINING PROGRAM

## 2020-07-29 PROCEDURE — 2580000003 HC RX 258: Performed by: STUDENT IN AN ORGANIZED HEALTH CARE EDUCATION/TRAINING PROGRAM

## 2020-07-29 PROCEDURE — 83735 ASSAY OF MAGNESIUM: CPT

## 2020-07-29 PROCEDURE — 80069 RENAL FUNCTION PANEL: CPT

## 2020-07-29 PROCEDURE — 36415 COLL VENOUS BLD VENIPUNCTURE: CPT

## 2020-07-29 RX ADMIN — VANCOMYCIN HYDROCHLORIDE 1250 MG: 10 INJECTION, POWDER, LYOPHILIZED, FOR SOLUTION INTRAVENOUS at 04:47

## 2020-07-29 RX ADMIN — PIPERACILLIN AND TAZOBACTAM 3.38 G: 3; .375 INJECTION, POWDER, LYOPHILIZED, FOR SOLUTION INTRAVENOUS at 18:08

## 2020-07-29 RX ADMIN — CLINDAMYCIN PHOSPHATE 900 MG: 900 INJECTION, SOLUTION INTRAVENOUS at 04:00

## 2020-07-29 RX ADMIN — PIPERACILLIN AND TAZOBACTAM 3.38 G: 3; .375 INJECTION, POWDER, LYOPHILIZED, FOR SOLUTION INTRAVENOUS at 06:47

## 2020-07-29 RX ADMIN — MAGNESIUM SULFATE HEPTAHYDRATE 3 G: 500 INJECTION, SOLUTION INTRAMUSCULAR; INTRAVENOUS at 12:16

## 2020-07-29 RX ADMIN — ENOXAPARIN SODIUM 40 MG: 40 INJECTION SUBCUTANEOUS at 09:05

## 2020-07-29 RX ADMIN — VANCOMYCIN HYDROCHLORIDE 1250 MG: 10 INJECTION, POWDER, LYOPHILIZED, FOR SOLUTION INTRAVENOUS at 15:58

## 2020-07-29 RX ADMIN — HYDROMORPHONE HYDROCHLORIDE 0.25 MG: 1 INJECTION, SOLUTION INTRAMUSCULAR; INTRAVENOUS; SUBCUTANEOUS at 18:08

## 2020-07-29 RX ADMIN — POTASSIUM CHLORIDE 30 MEQ: 20 TABLET, EXTENDED RELEASE ORAL at 12:16

## 2020-07-29 ASSESSMENT — PAIN SCALES - GENERAL
PAINLEVEL_OUTOF10: 0

## 2020-07-29 NOTE — PROGRESS NOTES
Bariatrics Surgery   Daily Progress Note  Patient: Conor Castorena    CC: Right groin abscess    SUBJECTIVE:  Patient rested well overnight. States that her right lower extremity pain is greatly improved. HDS. Patient tolerating her diet well. Denies nausea and vomiting. Urinating appropriately. Having bowel movements. ROS: A 14 point review of systems was conducted, significant findings as noted above. All other systems negative. OBJECTIVE:   Infusions:   I/O:I/O last 3 completed shifts: In: 2397.3 [P.O.:240; I.V.:1588.3; IV Piggyback:569.1]  Out: 9368 [Urine:2575]           Wt Readings from Last 1 Encounters:   07/27/20 175 lb (79.4 kg)       Exam:  BP (!) 101/54   Pulse 85   Temp 100.3 °F (37.9 °C) (Oral)   Resp 16   Ht 5' 6\" (1.676 m)   Wt 175 lb (79.4 kg)   SpO2 94%   BMI 28.25 kg/m²      General appearance: No acute distress   Neuro: Sensory-motor grossly intact   HEENT: mucous membranes are moist  Lungs: Non labored respirations   Heart: Normal rate   Abdomen: Soft, non-tender, non-distended   Skin: no obvious rashes   Extremities: no edema  Right groin: Surgical incision packed, clean, and dry. Packing removed and replaced with wet to dry today. No purulence. Erythema with increased intensity with slight retraction of boarders. Improved edema and induration. LABS:   Recent Labs     07/27/20 2147 07/29/20  0436   WBC 12.2* 11.1*   HGB 12.5 10.9*   HCT 37.2 31.9*   MCV 91.0 91.0    353        Recent Labs     07/27/20 2147 07/29/20  0436   * 136   K 3.4* 3.7   CL 97* 99   CO2 25 25   PHOS  --  3.9   BUN 7 4*   CREATININE 0.7 0.8      No results for input(s): AST, ALT, ALB, BILIDIR, BILITOT, ALKPHOS in the last 72 hours. No results for input(s): LIPASE, AMYLASE in the last 72 hours. Recent Labs     07/28/20  0406   INR 1.20*      No results for input(s): CKTOTAL, CKMB, CKMBINDEX, TROPONINI in the last 72 hours.     ASSESSMENT/PLAN:   Patient is a 79 y.o. female status post I&D of right thigh abscess. POD#1.    -Continue antibiotics. Vancomycin/Zosyn. Initial cultures with gram + cocci.   -Patient has home assistance. Does not need home care. -Anticipate discharge 1-2 days pending culture results.     Elver Gregory MD  General Surgery, PGY1  07/29/20  6:10 AM  [phone]

## 2020-07-29 NOTE — PROGRESS NOTES
Physician Progress Note      Maira Coronado  [facility] #:                  [id number]  :                       1950  ADMIT DATE:       2020 9:12 PM  [address] [place] DATE:  RESPONDING  PROVIDER #:        Eileen Elena MD          QUERY TEXT:    Pt admitted with Abscess right groin. SIRS on admission: WBC: 12.2, T. 101.0,   HR: 103 . If possible, please document in the progress notes and discharge   summary if you are evaluating and /or treating any of the following: The medical record reflects the following:  Risk Factors: 79 y.o., developed right groin pain last week  Clinical Indicators: Temp at home 103.0, taking Tylenol at home for fevers,   seen by PCP twice, second time instructed to come to ED; SIRS on admission:   WBC: 12.2, T. 101.0, HR: 103  Treatment: Vanc/ Zosyn/ Clindamycin, IVF @ 125, OR  Options provided:  -- Sepsis, present on admission due to right groin abscess  -- No Sepsis, right groin abscess only  -- Other - I will add my own diagnosis  -- Disagree - Clinically unable to determine / Unknown  -- Refer to Clinical Documentation Reviewer    PROVIDER RESPONSE TEXT:    This patient has Sepsis, present on admission due to right groin abscess. Query created by: Lindsey Kruse on 2020 2:03 PM      QUERY TEXT:    Noted documentation of Cellulitis by ED physician, without mention by   attending. If possible, please document in progress notes and discharge   summary:    The medical record reflects the following:  Risk Factors: abscess  Clinical Indicators: Per ED \"large area of cellulitis\"; Per H&P exam   \"induration and erythema extending from just above the inguinal ligament to   the medial med thigh and right labia\".   Treatment: Vanc/ Zosyn/ Clinda, OR  Options provided:  -- Cellulitis confirmed  -- Cellulitis ruled out  -- Other - I will add my own diagnosis  -- Disagree - Clinically unable to determine / Unknown  -- Refer to Clinical Documentation Reviewer    PROVIDER RESPONSE TEXT:    The diagnosis of Cellulitis was confirmed. Query created by:  Dimitri Mari on 7/28/2020 2:07 PM      Electronically signed by:  Eleazar Giron MD 7/29/2020 5:54 AM

## 2020-07-29 NOTE — PLAN OF CARE
Problem: Falls - Risk of:  Goal: Will remain free from falls  Description: Will remain free from falls  7/29/2020 0259 by Raiza Salmeron RN  Outcome: Ongoing  Note: At medium risk for falls per Bauer Michi scale. Oriented x 4. Ambulates to bathroom with fairly steady gait with standby assist. Needed items in reach. Fall precautions in place. Problem: Physical Regulation:  Goal: Will remain free from infection  Description: Will remain free from infection  Outcome: Ongoing  Note: Temp 100.8 at 2400. Rechecked at [facility] and was 99.4. Noted large amount serosanguinous drainage on R thigh drsg earlier this evening Surgical resident notified and came up to change drsg. Noted insp crackles L base earlier on assessment. Encouraged use of IS. Will continue to monitor and plan of care.

## 2020-07-29 NOTE — PROGRESS NOTES
Physician Progress Note      Hailey Torres  [facility] #:                  [id number]  :                       1950  ADMIT DATE:       2020 9:12 PM  [address] [place] DATE:  RESPONDING  PROVIDER #:        Dasia Jimenez DO          QUERY TEXT:    Patient admitted with \"deep right thigh abscess\". Per Op note dated    documentation of I&D. To accurately reflect the procedure performed please further specify the depth   of tissue incised and drained: The medical record reflects the following:  Risk Factors: deep abscess  Clinical Indicators: Per Op note \"large fluid collection deep and abutting the   muscle; upon digital inspection, a large pocket of about 50 mL of purulent   malodorous liquid was expressed. Site was then digitally inspected laterally   and medially as well as inferiorly and superiorly; packed using 0.5 inch   Iodoform packing and packed in its entirety\"  Treatment: I&D  Options provided:  -- Skin only  -- Subcutaneous tissue  -- Fascia  -- Muscle  -- Other - I will add my own diagnosis  -- Disagree - Clinically unable to determine / Unknown  -- Refer to Clinical Documentation Reviewer    PROVIDER RESPONSE TEXT:    Addendum to 2020 procedure note The depth of the drainage to right thigh   was down to and including subcutaneous tissue. Query created by:  Duarte Molina on 2020 8:04 AM      Electronically signed by:  Dasia Jimenez DO 2020 8:37 AM

## 2020-07-29 NOTE — CARE COORDINATION
Case Management Assessment           Daily Note                 Date/ Time of Note: 7/29/2020 1:08 PM         Note completed by: Obey Hoang    Patient Name: Conor Castorena  YOB: 1950    Diagnosis:Groin abscess [L02.214]  Groin abscess [L02.214]  Patient Admission Status: Inpatient    Date of Admission:7/27/2020  9:12 PM Length of Stay: 1 GLOS:      Current Plan of Care: DC home in 1-2 days;  ________________________________________________________________________________________  PT AM-PAC:   / 24 per last evaluation on: not needed    OT AM-PAC:   / 24 per last evaluation on: not needed    DME Needs for discharge:   ________________________________________________________________________________________  Discharge Plan: return home with spouse    Tentative discharge date: 1-2 days    Current barriers to discharge: medical clearance    Referrals completed:     Resources/ information provided:   ________________________________________________________________________________________  Case Management Notes:  Pt will be returning home with spouse at discharge. Pt is independent with all ADL's, able to drive, no services in the home. Per Medical resident, Pt will not need Home Care at [place]. SW/CM will follow for any changes. Nida and her family were provided with choice of provider; she and her family are in agreement with the discharge plan.     Care Transition Patient: Shari Hoang [facility]  Case Management Department  Ph: [phone]

## 2020-07-29 NOTE — OP NOTE
[facility]                [address]                                OPERATIVE REPORT    PATIENT NAME: MICHELE Darby                         :        1950  MED REC NO:   [id number]                          ROOM:       6327  ACCOUNT NO:   [de-identified]                           ADMIT DATE: 2020  PROVIDER:     Aggie Hussein DO    DATE OF PROCEDURE:  2020    PREOPERATIVE DIAGNOSIS:  Deep right thigh abscess. POSTOPERATIVE DIAGNOSIS:  Deep right thigh abscess. PROCEDURE PERFORMED:  Incision and drainage of deep right thigh abscess. SURGEON:  Aggie Hussein DO    ASSISTANT:  Pierre Quintana. ANESTHESIA:  Local and sedation. ESTIMATED BLOOD LOSS:  50 mL. SPECIMEN:  Cultures of wound. INDICATIONS FOR PROCEDURE:  The patient is a 42-year-old female who  presented with right groin and right thigh swelling and tenderness. This had been worsening and she presented with fevers and elevated white  blood cell count. CT scan was personally reviewed which showed a large  fluid collection deep and abutting the muscle. She was brought in and  consented for incision and drainage after understanding all risks,  benefits, and alternatives of the procedure. DESCRIPTION OF PROCEDURE:  The patient was brought to the operative  suite, laid in supine position with arms outstretched and after sedation  was administered, the patient was prepped and draped in the usual  sterile manner with Betadine. Local anesthetic was injected around the area of the inner thigh and a  15-blade was used to make an incision over the area of maximal  fluctuance. Immediately, there was small amount of pus that came out  and upon digital inspection, a large pocket of about 50 mL of purulent  malodorous liquid was expressed.   Site was then digitally inspected  laterally and medially as well as inferiorly and superiorly, and no  other obvious

## 2020-07-30 VITALS
BODY MASS INDEX: 28.12 KG/M2 | WEIGHT: 175 LBS | HEART RATE: 89 BPM | DIASTOLIC BLOOD PRESSURE: 74 MMHG | HEIGHT: 66 IN | RESPIRATION RATE: 18 BRPM | OXYGEN SATURATION: 96 % | TEMPERATURE: 98.6 F | SYSTOLIC BLOOD PRESSURE: 111 MMHG

## 2020-07-30 LAB
ALBUMIN SERPL-MCNC: 3.1 G/DL (ref 3.4–5)
ALBUMIN SERPL-MCNC: 3.3 G/DL (ref 3.4–5)
ANION GAP SERPL CALCULATED.3IONS-SCNC: 12 MMOL/L (ref 3–16)
ANION GAP SERPL CALCULATED.3IONS-SCNC: 8 MMOL/L (ref 3–16)
BASOPHILS ABSOLUTE: 0 K/UL (ref 0–0.2)
BASOPHILS RELATIVE PERCENT: 0.4 %
BODY FLUID CULTURE, STERILE: ABNORMAL
BODY FLUID CULTURE, STERILE: ABNORMAL
BUN BLDV-MCNC: 9 MG/DL (ref 7–20)
BUN BLDV-MCNC: 9 MG/DL (ref 7–20)
CALCIUM SERPL-MCNC: 9 MG/DL (ref 8.3–10.6)
CALCIUM SERPL-MCNC: 9.2 MG/DL (ref 8.3–10.6)
CHLORIDE BLD-SCNC: 103 MMOL/L (ref 99–110)
CHLORIDE BLD-SCNC: 104 MMOL/L (ref 99–110)
CO2: 25 MMOL/L (ref 21–32)
CO2: 29 MMOL/L (ref 21–32)
CREAT SERPL-MCNC: 1.1 MG/DL (ref 0.6–1.2)
CREAT SERPL-MCNC: 1.2 MG/DL (ref 0.6–1.2)
CREATININE URINE: 15.6 MG/DL (ref 28–259)
EOSINOPHILS ABSOLUTE: 0.3 K/UL (ref 0–0.6)
EOSINOPHILS RELATIVE PERCENT: 2.9 %
GFR AFRICAN AMERICAN: 54
GFR AFRICAN AMERICAN: 59
GFR NON-AFRICAN AMERICAN: 44
GFR NON-AFRICAN AMERICAN: 49
GLUCOSE BLD-MCNC: 114 MG/DL (ref 70–99)
GLUCOSE BLD-MCNC: 136 MG/DL (ref 70–99)
GRAM STAIN RESULT: ABNORMAL
HCT VFR BLD CALC: 34.3 % (ref 36–48)
HEMOGLOBIN: 11.7 G/DL (ref 12–16)
LYMPHOCYTES ABSOLUTE: 2 K/UL (ref 1–5.1)
LYMPHOCYTES RELATIVE PERCENT: 20.4 %
MAGNESIUM: 2.5 MG/DL (ref 1.8–2.4)
MCH RBC QN AUTO: 30.9 PG (ref 26–34)
MCHC RBC AUTO-ENTMCNC: 34.2 G/DL (ref 31–36)
MCV RBC AUTO: 90.5 FL (ref 80–100)
MONOCYTES ABSOLUTE: 0.7 K/UL (ref 0–1.3)
MONOCYTES RELATIVE PERCENT: 7.4 %
NEUTROPHILS ABSOLUTE: 6.6 K/UL (ref 1.7–7.7)
NEUTROPHILS RELATIVE PERCENT: 68.9 %
ORGANISM: ABNORMAL
OSMOLALITY URINE: 113 MOSM/KG (ref 390–1070)
PDW BLD-RTO: 13.1 % (ref 12.4–15.4)
PHOSPHORUS: 3.7 MG/DL (ref 2.5–4.9)
PHOSPHORUS: 4.7 MG/DL (ref 2.5–4.9)
PLATELET # BLD: 442 K/UL (ref 135–450)
PMV BLD AUTO: 8 FL (ref 5–10.5)
POTASSIUM SERPL-SCNC: 3.8 MMOL/L (ref 3.5–5.1)
POTASSIUM SERPL-SCNC: 4 MMOL/L (ref 3.5–5.1)
RBC # BLD: 3.79 M/UL (ref 4–5.2)
SODIUM BLD-SCNC: 140 MMOL/L (ref 136–145)
SODIUM BLD-SCNC: 141 MMOL/L (ref 136–145)
SODIUM URINE: 45 MMOL/L
VANCOMYCIN TROUGH: 26.1 UG/ML (ref 10–20)
WBC # BLD: 9.6 K/UL (ref 4–11)

## 2020-07-30 PROCEDURE — 2580000003 HC RX 258: Performed by: STUDENT IN AN ORGANIZED HEALTH CARE EDUCATION/TRAINING PROGRAM

## 2020-07-30 PROCEDURE — 85025 COMPLETE CBC W/AUTO DIFF WBC: CPT

## 2020-07-30 PROCEDURE — 2500000003 HC RX 250 WO HCPCS: Performed by: STUDENT IN AN ORGANIZED HEALTH CARE EDUCATION/TRAINING PROGRAM

## 2020-07-30 PROCEDURE — 6360000002 HC RX W HCPCS: Performed by: STUDENT IN AN ORGANIZED HEALTH CARE EDUCATION/TRAINING PROGRAM

## 2020-07-30 PROCEDURE — 36415 COLL VENOUS BLD VENIPUNCTURE: CPT

## 2020-07-30 PROCEDURE — 82570 ASSAY OF URINE CREATININE: CPT

## 2020-07-30 PROCEDURE — 84300 ASSAY OF URINE SODIUM: CPT

## 2020-07-30 PROCEDURE — 80202 ASSAY OF VANCOMYCIN: CPT

## 2020-07-30 PROCEDURE — 83735 ASSAY OF MAGNESIUM: CPT

## 2020-07-30 PROCEDURE — 83935 ASSAY OF URINE OSMOLALITY: CPT

## 2020-07-30 PROCEDURE — 80069 RENAL FUNCTION PANEL: CPT

## 2020-07-30 RX ORDER — DOXYCYCLINE 100 MG/1
100 TABLET ORAL 2 TIMES DAILY
Qty: 19 TABLET | Refills: 0 | Status: SHIPPED | OUTPATIENT
Start: 2020-07-30 | End: 2020-07-30 | Stop reason: SDUPTHER

## 2020-07-30 RX ORDER — PSEUDOEPHEDRINE HCL 30 MG
100 TABLET ORAL 2 TIMES DAILY PRN
Qty: 10 CAPSULE | Refills: 0 | Status: SHIPPED | OUTPATIENT
Start: 2020-07-30 | End: 2020-08-04

## 2020-07-30 RX ORDER — PSEUDOEPHEDRINE HCL 30 MG
100 TABLET ORAL 2 TIMES DAILY PRN
Qty: 10 CAPSULE | Refills: 0 | Status: SHIPPED | OUTPATIENT
Start: 2020-07-30 | End: 2020-07-30

## 2020-07-30 RX ORDER — OXYCODONE HYDROCHLORIDE 5 MG/1
5 TABLET ORAL EVERY 6 HOURS PRN
Qty: 12 TABLET | Refills: 0 | Status: SHIPPED | OUTPATIENT
Start: 2020-07-30 | End: 2020-07-30

## 2020-07-30 RX ORDER — OXYCODONE HYDROCHLORIDE 5 MG/1
5 TABLET ORAL EVERY 6 HOURS PRN
Qty: 12 TABLET | Refills: 0 | Status: SHIPPED | OUTPATIENT
Start: 2020-07-30 | End: 2020-08-02

## 2020-07-30 RX ORDER — 0.9 % SODIUM CHLORIDE 0.9 %
1000 INTRAVENOUS SOLUTION INTRAVENOUS ONCE
Status: COMPLETED | OUTPATIENT
Start: 2020-07-30 | End: 2020-07-30

## 2020-07-30 RX ORDER — CLINDAMYCIN PHOSPHATE 600 MG/50ML
600 INJECTION INTRAVENOUS EVERY 8 HOURS
Status: DISCONTINUED | OUTPATIENT
Start: 2020-07-30 | End: 2020-07-30

## 2020-07-30 RX ORDER — DOXYCYCLINE 100 MG/1
100 TABLET ORAL 2 TIMES DAILY
Qty: 19 TABLET | Refills: 0 | Status: SHIPPED | OUTPATIENT
Start: 2020-07-30 | End: 2020-08-09

## 2020-07-30 RX ADMIN — HYDROMORPHONE HYDROCHLORIDE 0.25 MG: 1 INJECTION, SOLUTION INTRAMUSCULAR; INTRAVENOUS; SUBCUTANEOUS at 06:14

## 2020-07-30 RX ADMIN — ENOXAPARIN SODIUM 40 MG: 40 INJECTION SUBCUTANEOUS at 08:33

## 2020-07-30 RX ADMIN — SODIUM CHLORIDE 1000 ML: 9 INJECTION, SOLUTION INTRAVENOUS at 06:14

## 2020-07-30 RX ADMIN — DOXYCYCLINE 100 MG: 100 INJECTION, POWDER, LYOPHILIZED, FOR SOLUTION INTRAVENOUS at 08:27

## 2020-07-30 RX ADMIN — PIPERACILLIN AND TAZOBACTAM 3.38 G: 3; .375 INJECTION, POWDER, LYOPHILIZED, FOR SOLUTION INTRAVENOUS at 01:42

## 2020-07-30 ASSESSMENT — PAIN SCALES - GENERAL
PAINLEVEL_OUTOF10: 0
PAINLEVEL_OUTOF10: 7

## 2020-07-30 NOTE — CARE COORDINATION
Case Management Assessment           Daily Note                 Date/ Time of Note: 7/30/2020 12:02 PM         Note completed by: Alan Sharma    Patient Name: Nahum Villareal  YOB: 1950    Diagnosis:Groin abscess [L02.214]  Groin abscess [L02.214]  Patient Admission Status: Inpatient    Date of Admission:7/27/2020  9:12 PM Length of Stay: 2 GLOS:      Current Plan of Care: rt groin abscess, monitor creatinine  ________________________________________________________________________________________  PT AM-PAC:   / 24 per last evaluation on: not ordered    OT AM-PAC:   / 24 per last evaluation on: not ordered    DME Needs for discharge: none  ________________________________________________________________________________________  Discharge Plan: Home    Tentative discharge date: today or tomorrow pending Cr levels    Current barriers to discharge: Cr level    Referrals completed: Not Applicable    Resources/ information provided: Not indicated at this time  ________________________________________________________________________________________  Case Management Notes:  CM met with pt. She is hoping to leave today. She states her daughter n law is and ER RN and she has agreed to do pt's dressing changes. Pt denies any other needs at this time. Nida and her family were provided with choice of provider; she and her family are in agreement with the discharge plan.     Care Transition Patient: Shari Sharma RN  [facility]  Case Management Department  Ph: [phone]

## 2020-07-30 NOTE — PROGRESS NOTES
Nephrology Consult Note                                                                                                                                                                                                                                                                                                                                                               Office : [phone]     Fax :[phone]              Patient's Name: Julio Martinez  8:48 AM  7/30/2020    Reason for Consult:  Increased Cr, Likely ATN   Requesting Physician:  Dontae Hurst MD      Chief Complaint:  Right groin abscess    History of Present Ilness:    Julio Martinez is a 79 y.o. female presenting with right groin pain and swelling that started after she was walking in some bushes. She thinks she was bitten by a spider. Pain was progressively getting worse and was associated with high fevers (up to 103 F). She initially thought that fevers were due to COVID, but after the test came back negative she decided to seek medical attention. The right groin abscess was incised and drained on 7/28. On 7/30 the nephrology team was consulted to evaluate pt for increased Cr that was suspected to be due to likely ATN. 7/30:   Pt resting comfortably in bed this AM. Making good urine and reports having > 5 bowel movements since last night. She states that she has a bowel movement almost every time she voids and the bowel movements have been 'easy' and of normal consistency and color. Denies blood in stool or urine, black or tarry stools, dizziness, lightheadedness, hearing difficulties, ringing in the ears, abdominal pain, chest pain, shortness of breath. Past Surgical History:   Procedure Laterality Date    APPENDECTOMY      INCISION AND DRAINAGE Right 7/28/2020    Right deep thigh abscess incision and drainage performed by Arian Petty DO at [address]            reports that she has never smoked.  She has never used later today   - if Cr better she can go home and follow Up as out pt     Right groin abscess  - s/p incision and drainage on 7/28  - cultures with gram + cocci  - vancomycin discontinued today, on doxycycline        I will discuss the patient and findings with my attending Dr. Rosita Morataya MD.      Prasad Hwang MD  PGY-1      Thank you for allowing us to participate in care of [address] [place] [address] free to contact  [facility] [address]  Office : [phone]  Fax :[phone]      Pt seen and examined     LAZARA - pt has ATN     Follow up as out pt

## 2020-07-30 NOTE — PLAN OF CARE
Problem: Falls - Risk of:  Goal: Will remain free from falls  Description: Will remain free from falls  Outcome: Met This Shift  Goal: Absence of physical injury  Description: Absence of physical injury  Outcome: Met This Shift     Problem: Safety:  Goal: Free from accidental physical injury  Description: Free from accidental physical injury  Outcome: Met This Shift     Problem: Daily Care:  Goal: Daily care needs are met  Description: Daily care needs are met  Outcome: Met This Shift     Problem: Pain:  Goal: Patient's pain/discomfort is manageable  Description: Patient's pain/discomfort is manageable  Outcome: Met This Shift     Problem: Skin Integrity:  Goal: Skin integrity will stabilize  Description: Skin integrity will stabilize  Outcome: Met This Shift     Problem: Discharge Planning:  Goal: Patients continuum of care needs are met  Description: Patients continuum of care needs are met  Outcome: Met This Shift     Problem: Physical Regulation:  Goal: Will remain free from infection  Description: Will remain free from infection  Outcome: Met This Shift  Goal: Ability to maintain vital signs within normal range will improve  Description: Ability to maintain vital signs within normal range will improve  Outcome: Met This Shift

## 2020-07-30 NOTE — CONSULTS
Pt has LAZARA sec to Ischemic ATN   Ur studies reviewed  Has post ATN diuresis     Labs pending later today   If Cr better she can go home and follow Up as out pt         Diana Butler

## 2020-07-30 NOTE — DISCHARGE SUMMARY
Discharge Summary      Patient:    Paramjit Champagne    Admit Date:   7/27/2020  9:12 PM    Discharge Date:   7/30/2020    Admitting Physician: Janay Olguin DO     Discharge Physician:   same    Admitting Diagnosis:  Abscess of right thigh     Discharge Diagnosis:   same     No past medical history on file. Indication for Admission:   Patient had a large abscess of right thigh requiring incision and drainage. Hospital Course:   HD1 - Presents with 1 week of right groin pain and swelling that started after she was walking in some bushes. The patient right groin was found be have an abscess, with induration and erythema extending from just above the inguinal ligament to the medial thigh and right labia. The patient also had a skin rash over the right groin. POD0 - Patient was taken to the operating room for an incision and drainage of the right thigh. Purulent fluid was found within the abscess. Incision was packed with 1/2 inch iodoform packing. Patient's erythema was markedly improved post-operatively. POD1 - Patient rested well overnight, her pain markedly improvement with her pain. Patient having BM. Continued on Vancomycin and zosyn. Cultures were positive for gram + cocci. Patient otherwise doing well. POD2 - Patient states that her groin pain is improved. Patient tolerating her diet well. Denies nausea and vomiting. Urinating appropriately. Having bowel movements. WBC normalized to 9.6 from 11.1. Discontinued vanco/zosyn. Started on doxycycline. Wet-to-dry dressing changes of right thigh continued. Patient discharged in stable condition to home. Discharge physical exam:  General appearance: No acute distress   Neuro: Sensory-motor grossly intact   HEENT: mucous membranes are moist  Lungs: Non labored respirations   Heart: Normal rate   Abdomen: Soft, non-tender, non-distended   Skin: no obvious rashes   Extremities: no edema  Right groin: Surgical incision packed, clean, and dry.  Packing removed and replaced with wet to dry this morning. No purulence. Erythema is greatly improved. Disposition:    Home    Condition at discharge:  Stable    Discharge Instructions:  See separate form    Patient Instructions:      Medication List      You have not been prescribed any medications.          Damian Nicole DO  07/30/20  8:46 AM

## 2020-07-30 NOTE — PROGRESS NOTES
Pt discharged home with . All discharge teaching reviewed. Pt educated on wound care and supplies given and follow up appointments. Paper prescriptions sent with patient. IV removed and dressing applied. Pt taken to front entrance via wheelchair.

## 2020-07-30 NOTE — PROGRESS NOTES
normalized to 9.6 from 11.1. Wound culture with gram+ cocci. Discontinue vanco/zosyn this morning and start doxycycline    -Patient with increased creatinine to 1.2 from 0.8 with stable BUN. Vancomycin troph 26.1. Likely ATN from vancomycin. Discontinued this morning. Will recheck renal panel at 1400. If creatinine improved, stable for discharge home.   -Continue wet to dry dressing changes. Patient admits that family can help with dressing changes.   -Anticipate discharge today vs tomorrow pending renal panel.        Elver Gregory MD  General Surgery, PGY1  07/30/20  6:31 AM  [phone]

## 2020-07-31 LAB — BLOOD CULTURE, ROUTINE: NORMAL

## 2020-08-01 LAB — CULTURE, BLOOD 2: NORMAL

## 2020-08-02 LAB
ANAEROBIC CULTURE: ABNORMAL
ANAEROBIC CULTURE: ABNORMAL
ORGANISM: ABNORMAL

## 2020-08-05 LAB
REPORT: NORMAL
SARS-COV-2: NOT DETECTED
THIS TEST SENT TO: NORMAL

## 2020-08-13 ENCOUNTER — OFFICE VISIT (OUTPATIENT)
Dept: BARIATRICS/WEIGHT MGMT | Age: 70
End: 2020-08-13

## 2020-08-13 VITALS
SYSTOLIC BLOOD PRESSURE: 145 MMHG | BODY MASS INDEX: 28.54 KG/M2 | DIASTOLIC BLOOD PRESSURE: 75 MMHG | WEIGHT: 176.8 LBS | HEART RATE: 76 BPM | TEMPERATURE: 97.7 F

## 2020-08-13 DIAGNOSIS — N17.9 AKI (ACUTE KIDNEY INJURY) (HCC): ICD-10-CM

## 2020-08-13 LAB
ALBUMIN SERPL-MCNC: 4.2 G/DL (ref 3.4–5)
ANION GAP SERPL CALCULATED.3IONS-SCNC: 14 MMOL/L (ref 3–16)
BUN BLDV-MCNC: 18 MG/DL (ref 7–20)
CALCIUM SERPL-MCNC: 9.7 MG/DL (ref 8.3–10.6)
CHLORIDE BLD-SCNC: 102 MMOL/L (ref 99–110)
CO2: 25 MMOL/L (ref 21–32)
CREAT SERPL-MCNC: 0.9 MG/DL (ref 0.6–1.2)
GFR AFRICAN AMERICAN: >60
GFR NON-AFRICAN AMERICAN: >60
GLUCOSE BLD-MCNC: 97 MG/DL (ref 70–99)
PHOSPHORUS: 4.1 MG/DL (ref 2.5–4.9)
POTASSIUM SERPL-SCNC: 5 MMOL/L (ref 3.5–5.1)
SODIUM BLD-SCNC: 141 MMOL/L (ref 136–145)

## 2020-08-13 PROCEDURE — 99024 POSTOP FOLLOW-UP VISIT: CPT | Performed by: SURGERY

## 2020-08-13 RX ORDER — SULFAMETHOXAZOLE AND TRIMETHOPRIM 800; 160 MG/1; MG/1
1 TABLET ORAL 2 TIMES DAILY
Qty: 14 TABLET | Refills: 0 | Status: SHIPPED | OUTPATIENT
Start: 2020-08-13 | End: 2020-08-20

## 2020-08-15 NOTE — PROGRESS NOTES
Patient doing well  No N/V/F/C  Tolerating diet  Having regular BM's    Incision C/D/I  No erythema  No fluctuance  +Induration    Continue dressing changes daily    Continue abx for 1 more week    F/U YAZMINN    Keli Rand

## 2020-08-31 LAB
FUNGUS (MYCOLOGY) CULTURE: NORMAL
FUNGUS STAIN: NORMAL

## 2020-09-15 LAB
AFB CULTURE (MYCOBACTERIA): NORMAL
AFB SMEAR: NORMAL

## (undated) DEVICE — JEWISH HOSPITAL TURNOVER KIT: Brand: MEDLINE INDUSTRIES, INC.

## (undated) DEVICE — COVER LT HNDL BLU PLAS

## (undated) DEVICE — SURE SET-DOUBLE BASIN-LF: Brand: MEDLINE INDUSTRIES, INC.

## (undated) DEVICE — PAD,ABDOMINAL,5"X9",ST,LF,25/BX: Brand: MEDLINE INDUSTRIES, INC.

## (undated) DEVICE — GLOVE ORANGE PI 7   MSG9070

## (undated) DEVICE — ELECTROSURGICAL PENCIL ROCKER SWITCH NON COATED BLADE ELECTRODE 10 FT (3 M) CORD HOLSTER: Brand: MEGADYNE

## (undated) DEVICE — APPLICATOR MEDICATED 26 CC SOLUTION HI LT ORNG CHLORAPREP

## (undated) DEVICE — SURGICAL SET UP - SURE SET: Brand: MEDLINE INDUSTRIES, INC.

## (undated) DEVICE — GLOVE SURG SZ 75 L12IN THK75MIL DK GRN LTX FREE

## (undated) DEVICE — PLATE ES AD W 9FT CRD 2

## (undated) DEVICE — GARMENT,MEDLINE,DVT,INT,CALF,MED, GEN2: Brand: MEDLINE

## (undated) DEVICE — DRAPE,UTILTY,TAPE,15X26, 4EA/PK: Brand: MEDLINE

## (undated) DEVICE — GAUZE,PACKING STRIP,IODOFORM,1/2"X5YD,ST: Brand: CURAD